# Patient Record
Sex: MALE | Race: WHITE | URBAN - METROPOLITAN AREA
[De-identification: names, ages, dates, MRNs, and addresses within clinical notes are randomized per-mention and may not be internally consistent; named-entity substitution may affect disease eponyms.]

---

## 2020-11-20 ENCOUNTER — APPOINTMENT (RX ONLY)
Dept: URBAN - METROPOLITAN AREA CLINIC 11 | Facility: CLINIC | Age: 60
Setting detail: DERMATOLOGY
End: 2020-11-20

## 2020-11-20 DIAGNOSIS — S31000A OPEN WOUND(S) (MULTIPLE) OF UNSPECIFIED SITE(S), WITHOUT MENTION OF COMPLICATION: ICD-10-CM

## 2020-11-20 DIAGNOSIS — L0390 CELLULITIS AND ABSCESS OF UNSPECIFIED SITES: ICD-10-CM

## 2020-11-20 DIAGNOSIS — L0391 CELLULITIS AND ABSCESS OF UNSPECIFIED SITES: ICD-10-CM

## 2020-11-20 DIAGNOSIS — L28.1 PRURIGO NODULARIS: ICD-10-CM

## 2020-11-20 PROBLEM — S61.201A UNSPECIFIED OPEN WOUND OF LEFT INDEX FINGER WITHOUT DAMAGE TO NAIL, INITIAL ENCOUNTER: Status: ACTIVE | Noted: 2020-11-20

## 2020-11-20 PROBLEM — S61.203A UNSPECIFIED OPEN WOUND OF LEFT MIDDLE FINGER WITHOUT DAMAGE TO NAIL, INITIAL ENCOUNTER: Status: ACTIVE | Noted: 2020-11-20

## 2020-11-20 PROBLEM — L03.90 CELLULITIS, UNSPECIFIED: Status: ACTIVE | Noted: 2020-11-20

## 2020-11-20 PROCEDURE — ? COUNSELING - CELLULITIS

## 2020-11-20 PROCEDURE — ? COUNSELING - PRURIGO NODULARIS

## 2020-11-20 PROCEDURE — ? PATIENT SPECIFIC COUNSELING

## 2020-11-20 PROCEDURE — 99202 OFFICE O/P NEW SF 15 MIN: CPT

## 2020-11-20 PROCEDURE — ? PRESCRIPTION

## 2020-11-20 PROCEDURE — ? COUNSELING - WOUND CARE

## 2020-11-20 RX ORDER — SILVER SULFADIAZINE 10 MG/G
CREAM TOPICAL BID
Qty: 1 | Refills: 1 | Status: ERX | COMMUNITY
Start: 2020-11-20

## 2020-11-20 RX ADMIN — SILVER SULFADIAZINE: 10 CREAM TOPICAL at 00:00

## 2020-11-20 ASSESSMENT — LOCATION ZONE DERM: LOCATION ZONE: FINGER

## 2020-11-20 ASSESSMENT — LOCATION DETAILED DESCRIPTION DERM
LOCATION DETAILED: LEFT DISTAL PALMAR INDEX FINGER
LOCATION DETAILED: LEFT DISTAL PALMAR MIDDLE FINGER

## 2020-11-20 ASSESSMENT — LOCATION SIMPLE DESCRIPTION DERM
LOCATION SIMPLE: LEFT MIDDLE FINGER
LOCATION SIMPLE: LEFT INDEX FINGER

## 2020-11-20 ASSESSMENT — PAIN INTENSITY VAS: HOW INTENSE IS YOUR PAIN 0 BEING NO PAIN, 10 BEING THE MOST SEVERE PAIN POSSIBLE?: NO PAIN

## 2020-11-20 NOTE — PROCEDURE: PATIENT SPECIFIC COUNSELING
Other (Free Text): Discussed patient following up with his PCP, Dr. Pascal at [facility] for possible hospital admission and abx. Patient understood he was not a surgical candidate at our office and would follow up with his PCP. Pt will RTC in 3 weeks
Detail Level: Simple

## 2020-11-20 NOTE — HPI: WOUND
Is The Wound New, Recurrent, Or Chronic?: new
How Is Your Wound Healing?: fresh
Is This A New Presentation, Or A Follow-Up?: Wound
Additional History: Patient is coming from [facility] ED regarding his L index and middle finger. Patient states he noticed the wound on 11/15 and went to [facility] on 11/16 to be seen. Patient was given oral antibiotics he has not yet filled